# Patient Record
Sex: MALE | Race: WHITE
[De-identification: names, ages, dates, MRNs, and addresses within clinical notes are randomized per-mention and may not be internally consistent; named-entity substitution may affect disease eponyms.]

---

## 2018-01-12 ENCOUNTER — HOSPITAL ENCOUNTER (EMERGENCY)
Dept: HOSPITAL 68 - ERH | Age: 63
End: 2018-01-12
Payer: COMMERCIAL

## 2018-01-12 VITALS — BODY MASS INDEX: 28.42 KG/M2 | WEIGHT: 203 LBS | HEIGHT: 71 IN

## 2018-01-12 VITALS — DIASTOLIC BLOOD PRESSURE: 82 MMHG | SYSTOLIC BLOOD PRESSURE: 160 MMHG

## 2018-01-12 DIAGNOSIS — I10: ICD-10-CM

## 2018-01-12 DIAGNOSIS — Z87.891: ICD-10-CM

## 2018-01-12 DIAGNOSIS — G44.209: Primary | ICD-10-CM

## 2018-01-12 NOTE — ED HEADACHE COMPLAINT
History of Present Illness
 
General
Chief Complaint: Dizziness
Stated Complaint: DIZZY,NAUSEA
Source: patient
Exam Limitations: no limitations
 
Vital Signs & Intake/Output
Vital Signs & Intake/Output
 Vital Signs
 
 
Date Time Temp Pulse Resp B/P B/P Pulse O2 O2 Flow FiO2
 
     Mean Ox Delivery Rate 
 
01/12 0851  64 18 160/82  97 Room Air  
 
01/12 0732 98.1 78 18 168/95  99 Room Air  
 
 
 
Allergies
Coded Allergies:
No Known Allergies (05/02/16)
 
Reconcile Medications
Butalb/Acetaminophen/Caffeine (Zebutal -40 MG Capsule) 50 MG-325 MG-40 MG 
CAPSULE   1 TAB PO TID PRN HEADACHE
Chlorthalidone 25 MG TABLET   1 TAB PO DAILY HEART  (Reported)
Desloratadine (Clarinex) 5 MG TABLET   1 TAB PO DAILY ALLERGIES
Losartan Potassium 50 MG TABLET   1 TAB PO DAILY HEART  (Reported)
Meloxicam (Mobic) 15 MG TABLET   1 TAB PO DAILY PRN HEADACHE
Ondansetron HCl (Zofran) 4 MG TABLET   1 TAB PO Q6-8P PRN NAUSEA
Sumatriptan Succinate (Imitrex) 50 MG TABLET   1 TAB PO AD PRN HEADACHE
     TAKE ONE TAB BY MOUTH, IF NO BETTER, REPEAT IN 30 MINUTES
     DO NOT EXCEED TWO TABS IN ONE DAY
 
Triage Note:
PT TO ED C/O "SHORT SHARP HEADACHES" TO BE SIDES
 OF HEAD OFF AND ON "FOR MONTHS".  PCP PERSCRIBED
 FLONES "WHICH HELPED FOR A WHILE"  "WHEN I GET THE
 HEADACHES, I POP MY EARS AND THEY GO AWAY" "I
 CAN'T ALWAYS POP MY EARS"  PT STATES IS ALWAYS
 LIGHTHEADED.  HAS NAUSEA WHEN LIGHTHEADEDNESS "IS
 MORE INTENSE"  DENIES SINUS CONGESTION
Triage Nurses Notes Reviewed? yes
Onset: Gradual
Duration: constant
Timing: recent history
Quality/Severity: moderate, pressure
Severity Numbers: 5
HPI:
Patient is a 62-year-old male with a past medical history of hypertension who 
presents emergency room with a 3-4 month history of intermittent global bandlike
pressure headache symptoms that her paroxysmal nature states he was evaluated 3 
weeks ago by his primary care doctor where he was advised to take Flonase which 
improved his symptoms however he stopped taking it last week symptoms recurred 
unrelieved with the Flonase.  Patient has also been taking NSAIDs with minimal 
relief of symptoms.  Patient has associated symptoms of dizziness and mild 
nausea without emesis
Patient does state that he has a remote history of  an incidental finding in 
1994 of a brain aneurysm where he had this repaired
Patient denies any acute onset or thunderclap headache or worse headache of life
denies any unilateral temporal headache or tenderness upon palpation of this 
temporal region, patient denies any fever or chills blurred vision visual field 
defect, slurred speech facial droop unilateral weakness neck pain or neck 
stiffness.
Patient also complaining of nasal congestion and head congestion
 
Past History
 
Travel History
Traveled to [place] past 21 day No
 
Medical History
Any Pertinent Medical History? see below for history
Neurological: brain aneurism
Cardiovascular: hypertension
Gastrointestinal: GERD
Hepatic: hepatitis C
 
Surgical History
Surgical History: BRAIN ANEURYSM REPAIR
 
Psychosocial History
What is your primary language English
Tobacco Use: Quit >30 days ago
ETOH Use: occasional use
Illicit Drug Use: denies illicit drug use
 
Family History
Hx Contributory? No
 
Review of Systems
 
Review of Systems
Constitutional:
Reports: no symptoms.  Denies: chills, fever. 
Eyes:
Reports: see HPI.  Denies: blurred vision. 
Ears, Nose, Throat, Mouth:
Reports: see HPI. 
Respiratory:
Reports: no symptoms. 
Cardiovascular:
Reports: no symptoms. 
Gastrointestinal/Abdominal:
Reports: see HPI, nausea.  Denies: abdominal pain. 
Genitourinary:
Reports: no symptoms. 
Musculoskeletal:
Reports: no symptoms. 
Skin:
Reports: no symptoms. 
Neurological/Psychological:
Reports: see HPI, headache. 
Hematologic/Endocrine:
Reports: no symptoms. 
Endocrine:
Reports: no symptoms. 
Immunologic/Allergic:
Reports: no symptoms. 
All Other Systems: Reviewed and Negative
 
Physical Exam
 
Physical Exam
General Appearance: no apparent distress, alert, comfortable
Head: atraumatic
Eyes:
Bilateral: normal appearance, PERRL, EOMI. 
Ears, Nose, Throat: normal pharynx, hearing grossly normal
Neck: normal inspection, supple
Respiratory: normal breath sounds, chest non-tender, no respiratory distress
Cardiovascular: regular rate/rhythm
Gastrointestinal: normal bowel sounds, soft, non-tender
Extremities: normal inspection, normal capillary refill
Psychiatric: awake, alert, oriented x 3
Cranial Nerves: normal hearing, normal speech, PERRL, CB II-XII INTACT
Coordination/Gait: normal finger to nose, normal gait
Skin: intact, normal color, warm/dry
 
Core Measures
Sepsis Present: No
Sepsis Focused Exam Completed? No
 
Progress
Differential Diagnosis: carotid dissection, cav sinus thromb, cluster HA, 
encephalitis, IC mass/tumor, intracranial Hem., meningitis, migraine HA, 
musculoskeletal pain, sinusitis, SSS thrombosis, subarach. Hem., tension HA, 
temporal arteritis, TMJ syndrome, viral cephalgia
Plan of Care:
On initial examination patient is resting comfortably at bedside and has nasal 
congestion noted no sinus point tenderness, patient does have elevated blood 
pressure however no concerns of hypertension urgency or emergency, patient does 
state that he was evaluated 3 weeks ago for routine follow-up and his blood 
pressure was "normal" at 130/80
Patient's cranial nerves were intact no neurological deficit no concerns of TIA 
or CVA, no concerns of brain mass or ICH at this time
 of my differential diagnosis GIANT cell arteritis however patient does not have
point tenderness and my suspicion is very low at this time.  I had a long 
extensive conversation with patient follow-up with discharge instructions and 
plan for neurologist and there is also suspicion of tension migraine headaches
 
Upon discharge patient looks well no apparent distress and will comply discharge
instructions and had no questions
 
At this time there was no EMERGENT warranting for imaging of patient's symptoms 
for his head
 
 
Departure
 
Departure
Disposition: HOME OR SELF CARE
Condition: Stable
Clinical Impression
Primary Impression: Tension headache
Secondary Impressions: Hypertension
Referrals:
Maricruz FRY,Ganesh Toure MD,Alysa BENNETT (PCP/Family)
 
Additional Instructions:
As discussed begin the prescription of Clarinex and continue your previously 
prescribed Flonase for your symptoms, begin a prescription of meloxicam for 
primary headache relief, begin the prescription of Imitrex for secondary 
headache relief and a prescription of ZEBUTAL for breakthrough headache relief, 
begin the prescription of Zofran for nausea, prescription is waiting a [facility] 
[place].  If no better in one week follow-up with your primary care doctor and 
neurologist .  If symptoms worsen or if YOU develop any new concerning
symptom return to emergency room
Departure Forms:
Customer Survey
General Discharge Information
Prescriptions:
Current Visit Scripts
Meloxicam (Mobic) 1 TAB PO DAILY PRN HEADACHE 
     #10 TAB 
 
Sumatriptan Succinate (Imitrex) 1 TAB PO AD PRN HEADACHE 
     #9 TAB 
     TAKE ONE TAB BY MOUTH, IF NO BETTER, REPEAT IN 30 MINUTES
     DO NOT EXCEED TWO TABS IN ONE DAY
 
Butalb/Acetaminophen/Caffeine (Zebutal -40 MG Capsule) 1 TAB PO TID PRN 
HEADACHE 
     #15 TAB 
 
Desloratadine (Clarinex) 1 TAB PO DAILY  
     #20 TAB 
 
Ondansetron HCl (Zofran) 1 TAB PO Q6-8P PRN NAUSEA 
     #10 TAB